# Patient Record
Sex: FEMALE | Race: WHITE | Employment: STUDENT | URBAN - METROPOLITAN AREA
[De-identification: names, ages, dates, MRNs, and addresses within clinical notes are randomized per-mention and may not be internally consistent; named-entity substitution may affect disease eponyms.]

---
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
## 2024-05-26 ENCOUNTER — HOSPITAL ENCOUNTER (EMERGENCY)
Age: 19
Discharge: HOME | End: 2024-05-26
Attending: EMERGENCY MEDICINE
Payer: COMMERCIAL

## 2024-05-26 VITALS
DIASTOLIC BLOOD PRESSURE: 76 MMHG | RESPIRATION RATE: 18 BRPM | WEIGHT: 250 LBS | HEART RATE: 84 BPM | TEMPERATURE: 98.6 F | OXYGEN SATURATION: 99 % | HEIGHT: 62 IN | SYSTOLIC BLOOD PRESSURE: 133 MMHG | BODY MASS INDEX: 46.01 KG/M2

## 2024-05-26 DIAGNOSIS — T78.40XA ALLERGIC REACTION, INITIAL ENCOUNTER: Primary | ICD-10-CM

## 2024-05-26 PROCEDURE — 6360000002 HC RX W HCPCS: Performed by: EMERGENCY MEDICINE

## 2024-05-26 PROCEDURE — 99283 EMERGENCY DEPT VISIT LOW MDM: CPT

## 2024-05-26 RX ORDER — DEXAMETHASONE 4 MG/1
6 TABLET ORAL
Status: COMPLETED | OUTPATIENT
Start: 2024-05-26 | End: 2024-05-26

## 2024-05-26 RX ORDER — DEXAMETHASONE 6 MG/1
6 TABLET ORAL DAILY
Qty: 2 TABLET | Refills: 0 | Status: SHIPPED | OUTPATIENT
Start: 2024-05-26 | End: 2024-05-28

## 2024-05-26 RX ADMIN — DEXAMETHASONE 6 MG: 4 TABLET ORAL at 22:08

## 2024-05-26 ASSESSMENT — PAIN - FUNCTIONAL ASSESSMENT: PAIN_FUNCTIONAL_ASSESSMENT: 0-10

## 2024-05-26 ASSESSMENT — LIFESTYLE VARIABLES
HOW OFTEN DO YOU HAVE A DRINK CONTAINING ALCOHOL: NEVER
HOW MANY STANDARD DRINKS CONTAINING ALCOHOL DO YOU HAVE ON A TYPICAL DAY: PATIENT DOES NOT DRINK

## 2024-05-26 ASSESSMENT — PAIN SCALES - GENERAL: PAINLEVEL_OUTOF10: 0

## 2024-05-27 NOTE — ED PROVIDER NOTES
Emergency Department Provider Note       PCP: No, Pcp   Age: 19 y.o.   Sex: female     DISPOSITION Decision To Discharge 05/26/2024 10:04:40 PM       ICD-10-CM    1. Allergic reaction, initial encounter  T78.40XA           Medical Decision Making     Patient is a 19-year-old female with no significant medical history who presents with an apparent allergic reaction.  She states she was swimming and was stung on the forehead by a horse fly.  She had immediate pain to her forehead.  Shortly after that, she noticed some redness to her forehead which then spread down around her eyes with swelling.  She denies similar symptoms in the past.  She took some Benadryl at home without any improvement.  She got concerned about the spreading symptoms and came here for evaluation.  She denies any shortness of breath or wheezing.    Symptoms are mild, no wheezing or feeling of swelling in her throat.  She will be treated with steroids here and prescribed a short course for home.  Considered admitting her to the hospital, feel that she is stable for outpatient follow-up given her overall clinical picture.     1 acute, uncomplicated illness or injury.  Prescription drug management performed.    I independently ordered and reviewed each unique test.                     History     Patient is a 19-year-old female with no significant medical history who presents with an apparent allergic reaction.  She states she was swimming and was stung on the forehead by a horse fly.  She had immediate pain to her forehead.  Shortly after that, she noticed some redness to her forehead which then spread down around her eyes with swelling.  She denies similar symptoms in the past.  She took some Benadryl at home without any improvement.  She got concerned about the spreading symptoms and came here for evaluation.  She denies any shortness of breath or wheezing.        ROS     Review of Systems   Constitutional:  Negative for chills and fever.

## 2024-05-27 NOTE — ED TRIAGE NOTES
Pt to Ed with c/o allergic reaction to a bite from a horsefly. Pt reports being bit around 5:30 pm. Pt took 25 mg of benadryl around 6 pm this evening. Pt reports symptom improvement from actual bite but reports her eyes have now become red and swollen. Pt ABCs in tact. No airway involvement, denies SOB

## 2024-05-27 NOTE — DISCHARGE INSTRUCTIONS
Follow-up with [facility], call the office to make an appointment.  Return to the emergency department if your symptoms worsen despite the prescribed medicine